# Patient Record
Sex: MALE | Race: WHITE | Employment: UNEMPLOYED | URBAN - NONMETROPOLITAN AREA
[De-identification: names, ages, dates, MRNs, and addresses within clinical notes are randomized per-mention and may not be internally consistent; named-entity substitution may affect disease eponyms.]

---

## 2020-07-02 ENCOUNTER — HOSPITAL ENCOUNTER (EMERGENCY)
Facility: HOSPITAL | Age: 40
Discharge: HOME | End: 2020-07-02
Attending: NURSE PRACTITIONER | Admitting: NURSE PRACTITIONER
Payer: COMMERCIAL

## 2020-07-02 VITALS
OXYGEN SATURATION: 97 % | WEIGHT: 219.2 LBS | RESPIRATION RATE: 16 BRPM | SYSTOLIC BLOOD PRESSURE: 127 MMHG | DIASTOLIC BLOOD PRESSURE: 85 MMHG | TEMPERATURE: 98.6 F

## 2020-07-02 DIAGNOSIS — L25.5 DERMATITIS DUE TO PLANTS, INCLUDING POISON IVY, SUMAC, AND OAK: Primary | ICD-10-CM

## 2020-07-02 PROCEDURE — 99203 OFFICE O/P NEW LOW 30 MIN: CPT | Mod: Z6 | Performed by: NURSE PRACTITIONER

## 2020-07-02 PROCEDURE — 25000128 H RX IP 250 OP 636: Performed by: NURSE PRACTITIONER

## 2020-07-02 PROCEDURE — 96372 THER/PROPH/DIAG INJ SC/IM: CPT

## 2020-07-02 PROCEDURE — G0463 HOSPITAL OUTPT CLINIC VISIT: HCPCS | Mod: 25

## 2020-07-02 RX ORDER — DIPHENHYDRAMINE HYDROCHLORIDE 50 MG/ML
50 INJECTION INTRAMUSCULAR; INTRAVENOUS ONCE
Status: COMPLETED | OUTPATIENT
Start: 2020-07-02 | End: 2020-07-02

## 2020-07-02 RX ORDER — DIPHENHYDRAMINE HYDROCHLORIDE 50 MG/ML
50 INJECTION INTRAMUSCULAR; INTRAVENOUS ONCE
Status: DISCONTINUED | OUTPATIENT
Start: 2020-07-02 | End: 2020-07-02 | Stop reason: HOSPADM

## 2020-07-02 RX ORDER — PREDNISONE 20 MG/1
TABLET ORAL
Qty: 10 TABLET | Refills: 0 | Status: SHIPPED | OUTPATIENT
Start: 2020-07-02

## 2020-07-02 RX ADMIN — DIPHENHYDRAMINE HYDROCHLORIDE 50 MG: 50 INJECTION, SOLUTION INTRAMUSCULAR; INTRAVENOUS at 15:07

## 2020-07-02 NOTE — ED PROVIDER NOTES
History     Chief Complaint   Patient presents with     Rash     HPI     Jordan Lee is a 39 year old male who presents ambulatory with concerns of rash to bilateral upper extremities that started 2-3 days ago. Associated itching. Not painful. He has tried benadryl, hydrocortisone cream - no relief of rash or itching.     Had similar issues a few weeks ago and treated with benadryl and steroids with resolution of symptoms.         Allergies:  Allergies   Allergen Reactions     Wellbutrin [Bupropion]        Problem List:    There are no active problems to display for this patient.       Past Medical History:    No past medical history on file.    Past Surgical History:    No past surgical history on file.    Family History:    No family history on file.    Social History:  Marital Status:   [2]  Social History     Tobacco Use     Smoking status: Not on file   Substance Use Topics     Alcohol use: Not on file     Drug use: Not on file        Medications:    predniSONE (DELTASONE) 20 MG tablet          Review of Systems   Constitutional: Negative for chills and fever.   HENT: Negative for ear pain, rhinorrhea and sore throat.    Respiratory: Negative for cough and shortness of breath.    Skin: Positive for rash.       Physical Exam   BP: 127/85  Heart Rate: 79  Temp: 98.6  F (37  C)  Resp: 16  Weight: 99.4 kg (219 lb 3.2 oz)  SpO2: 97 %      Physical Exam  Constitutional:       General: He is not in acute distress.     Appearance: He is not toxic-appearing or diaphoretic.   Skin:     General: Skin is warm and dry.      Capillary Refill: Capillary refill takes less than 2 seconds.      Findings: Rash present.      Comments: Erythematous, linear rash to bilateral upper extremities with small papules, vesicles   Neurological:      Mental Status: He is alert and oriented to person, place, and time.      Gait: Gait is intact.   Psychiatric:         Mood and Affect: Mood normal.         Speech: Speech normal.          Behavior: Behavior normal. Behavior is cooperative.         ED Course        Procedures         No results found for this or any previous visit (from the past 24 hour(s)).    Medications   diphenhydrAMINE (BENADRYL) injection 50 mg (has no administration in time range)       Assessments & Plan (with Medical Decision Making)     I have reviewed the nursing notes.    I have reviewed the findings, diagnosis, plan and need for follow up with the patient.  (L25.5) Dermatitis due to plants, including poison ivy, sumac, and oak  (primary encounter diagnosis)  Comment: acute, symptomatic  Plan: vesicular rash consistent with contact dermatitis  - start prednisone 40 mg once daily with food x 5 days  - Continues with benadryl per package instructions for itching  - wash linens and clean towel        RETURN TO THE ED WITH NEW OR WORSENING SYMPTOMS.      Leanne Bailey CNP    New Prescriptions    PREDNISONE (DELTASONE) 20 MG TABLET    Take two tablets (= 40mg) each day for 5 (five) days       Final diagnoses:   Dermatitis due to plants, including poison ivy, sumac, and oak       7/2/2020   [facility]     Leanne Bailey CNP  07/14/20 1054

## 2020-07-02 NOTE — ED AVS SNAPSHOT
[facility]  [address] [address]  Phone:  [phone]                                    Jordan Lee   MRN: [id number]    Department:  [facility]   Date of Visit:  7/2/2020           After Visit Summary Signature Page    I have received my discharge instructions, and my questions have been answered. I have discussed any challenges I see with this plan with the nurse or doctor.    ..........................................................................................................................................  Patient/Patient Representative Signature      ..........................................................................................................................................  Patient Representative Print Name and Relationship to Patient    ..................................................               ................................................  Date                                   Time    ..........................................................................................................................................  Reviewed by Signature/Title    ...................................................              ..............................................  Date                                               Time          22EPIC Rev 08/18

## 2020-07-02 NOTE — DISCHARGE INSTRUCTIONS
(L25.5) Dermatitis due to plants, including poison ivy, sumac, and oak  (primary encounter diagnosis)  Comment: acute, symptomatic  Plan: vesicular rash consistent with contact dermatitis  - start prednisone 40 mg once daily with food x 5 days  - Continues with benadryl per package instructions for itching  - wash linens and clean towel        RETURN TO THE ED WITH NEW OR WORSENING SYMPTOMS.      Leanne Bailey, CNP

## 2020-07-14 ASSESSMENT — ENCOUNTER SYMPTOMS
FEVER: 0
RHINORRHEA: 0
COUGH: 0
CHILLS: 0
SHORTNESS OF BREATH: 0
SORE THROAT: 0